# Patient Record
Sex: FEMALE | URBAN - NONMETROPOLITAN AREA
[De-identification: names, ages, dates, MRNs, and addresses within clinical notes are randomized per-mention and may not be internally consistent; named-entity substitution may affect disease eponyms.]

---

## 2020-08-12 ENCOUNTER — APPOINTMENT (RX ONLY)
Dept: URBAN - NONMETROPOLITAN AREA CLINIC 22 | Facility: CLINIC | Age: 75
Setting detail: DERMATOLOGY
End: 2020-08-12

## 2020-08-12 DIAGNOSIS — Z85.828 PERSONAL HISTORY OF OTHER MALIGNANT NEOPLASM OF SKIN: ICD-10-CM

## 2020-08-12 DIAGNOSIS — L57.8 OTHER SKIN CHANGES DUE TO CHRONIC EXPOSURE TO NONIONIZING RADIATION: ICD-10-CM

## 2020-08-12 DIAGNOSIS — L82.1 OTHER SEBORRHEIC KERATOSIS: ICD-10-CM

## 2020-08-12 DIAGNOSIS — D22 MELANOCYTIC NEVI: ICD-10-CM

## 2020-08-12 DIAGNOSIS — D18.0 HEMANGIOMA: ICD-10-CM

## 2020-08-12 DIAGNOSIS — D485 NEOPLASM OF UNCERTAIN BEHAVIOR OF SKIN: ICD-10-CM

## 2020-08-12 PROBLEM — D22.5 MELANOCYTIC NEVI OF TRUNK: Status: ACTIVE | Noted: 2020-08-12

## 2020-08-12 PROBLEM — D18.01 HEMANGIOMA OF SKIN AND SUBCUTANEOUS TISSUE: Status: ACTIVE | Noted: 2020-08-12

## 2020-08-12 PROBLEM — D48.5 NEOPLASM OF UNCERTAIN BEHAVIOR OF SKIN: Status: ACTIVE | Noted: 2020-08-12

## 2020-08-12 PROCEDURE — ? BIOPSY BY SHAVE METHOD

## 2020-08-12 PROCEDURE — 11103 TANGNTL BX SKIN EA SEP/ADDL: CPT

## 2020-08-12 PROCEDURE — 99203 OFFICE O/P NEW LOW 30 MIN: CPT | Mod: 25

## 2020-08-12 PROCEDURE — 11102 TANGNTL BX SKIN SINGLE LES: CPT

## 2020-08-12 PROCEDURE — ? ADDITIONAL NOTES

## 2020-08-12 PROCEDURE — ? COUNSELING

## 2020-08-12 PROCEDURE — ? FULL BODY SKIN EXAM

## 2020-08-12 ASSESSMENT — LOCATION DETAILED DESCRIPTION DERM
LOCATION DETAILED: SUPERIOR THORACIC SPINE
LOCATION DETAILED: LEFT PROXIMAL DORSAL FOREARM
LOCATION DETAILED: UPPER STERNUM
LOCATION DETAILED: RIGHT INFERIOR MEDIAL FOREHEAD
LOCATION DETAILED: UPPER STERNUM
LOCATION DETAILED: NASAL SUPRATIP

## 2020-08-12 ASSESSMENT — LOCATION ZONE DERM
LOCATION ZONE: TRUNK
LOCATION ZONE: ARM
LOCATION ZONE: NOSE
LOCATION ZONE: TRUNK
LOCATION ZONE: FACE

## 2020-08-12 ASSESSMENT — LOCATION SIMPLE DESCRIPTION DERM
LOCATION SIMPLE: CHEST
LOCATION SIMPLE: NOSE
LOCATION SIMPLE: UPPER BACK
LOCATION SIMPLE: RIGHT FOREHEAD
LOCATION SIMPLE: CHEST
LOCATION SIMPLE: LEFT FOREARM

## 2020-08-12 NOTE — PROCEDURE: MIPS QUALITY
Quality 226: Preventive Care And Screening: Tobacco Use: Screening And Cessation Intervention: Patient screened for tobacco use and is an ex/non-smoker
Quality 47: Advance Care Plan: Advance care planning not documented, reason not otherwise specified.
Quality 130: Documentation Of Current Medications In The Medical Record: Current Medications Documented
Quality 431: Preventive Care And Screening: Unhealthy Alcohol Use - Screening: Patient screened for unhealthy alcohol use using a single question and scores less than 2 times per year
Detail Level: Detailed

## 2020-08-12 NOTE — PROCEDURE: BIOPSY BY SHAVE METHOD
Detail Level: Detailed
Depth Of Biopsy: dermis
Was A Bandage Applied: Yes
Size Of Lesion In Cm: 0
Biopsy Type: H and E
Biopsy Method: Dermablade
Anesthesia Type: 1% lidocaine without epinephrine
Anesthesia Volume In Cc (Will Not Render If 0): 0.5
Hemostasis: Cole's
Wound Care: Petrolatum
Dressing: bandage
Destruction After The Procedure: No
Type Of Destruction Used: Curettage
Curettage Text: The wound bed was treated with curettage after the biopsy was performed.
Cryotherapy Text: The wound bed was treated with cryotherapy after the biopsy was performed.
Electrodesiccation Text: The wound bed was treated with electrodesiccation after the biopsy was performed.
Electrodesiccation And Curettage Text: The wound bed was treated with electrodesiccation and curettage after the biopsy was performed.
Silver Nitrate Text: The wound bed was treated with silver nitrate after the biopsy was performed.
Lab: 6
Lab Facility: [id number]
Consent: Written consent was obtained and risks were reviewed including but not limited to scarring, infection, bleeding, scabbing, incomplete removal, nerve damage and allergy to anesthesia.
Post-Care Instructions: I reviewed with the patient in detail post-care instructions. Patient is to keep the biopsy site dry overnight, and then apply bacitracin twice daily until healed. Patient may apply hydrogen peroxide soaks to remove any crusting.
Notification Instructions: Patient will be notified of biopsy results. However, patient instructed to call the office if not contacted within 2 weeks.
Billing Type: Third-Party Bill
Information: Selecting Yes will display possible errors in your note based on the variables you have selected. This validation is only offered as a suggestion for you. PLEASE NOTE THAT THE VALIDATION TEXT WILL BE REMOVED WHEN YOU FINALIZE YOUR NOTE. IF YOU WANT TO FAX A PRELIMINARY NOTE YOU WILL NEED TO TOGGLE THIS TO 'NO' IF YOU DO NOT WANT IT IN YOUR FAXED NOTE.

## 2021-01-20 ENCOUNTER — OFFICE VISIT (OUTPATIENT)
Dept: URBAN - METROPOLITAN AREA CLINIC 63 | Facility: CLINIC | Age: 76
End: 2021-01-20

## 2021-02-19 ENCOUNTER — OFFICE VISIT (OUTPATIENT)
Dept: URBAN - METROPOLITAN AREA CLINIC 63 | Facility: CLINIC | Age: 76
End: 2021-02-19

## 2021-03-03 ENCOUNTER — OFFICE VISIT (OUTPATIENT)
Dept: URBAN - METROPOLITAN AREA CLINIC 63 | Facility: CLINIC | Age: 76
End: 2021-03-03

## 2021-03-09 ENCOUNTER — OFFICE VISIT (OUTPATIENT)
Dept: URBAN - METROPOLITAN AREA TELEHEALTH 2 | Facility: TELEHEALTH | Age: 76
End: 2021-03-09

## 2021-09-20 ENCOUNTER — OFFICE VISIT (OUTPATIENT)
Dept: URBAN - METROPOLITAN AREA CLINIC 121 | Facility: CLINIC | Age: 76
End: 2021-09-20

## 2021-11-05 ENCOUNTER — OFFICE VISIT (OUTPATIENT)
Dept: URBAN - METROPOLITAN AREA CLINIC 63 | Facility: CLINIC | Age: 76
End: 2021-11-05

## 2022-05-26 ENCOUNTER — OFFICE VISIT (OUTPATIENT)
Dept: URBAN - METROPOLITAN AREA CLINIC 60 | Facility: CLINIC | Age: 77
End: 2022-05-26

## 2022-06-01 ENCOUNTER — OFFICE VISIT (OUTPATIENT)
Dept: URBAN - METROPOLITAN AREA SURGERY CENTER 4 | Facility: SURGERY CENTER | Age: 77
End: 2022-06-01

## 2022-06-03 LAB — PATHOLOGY (INDENTED REPORT): (no result)

## 2022-06-09 ENCOUNTER — OFFICE VISIT (OUTPATIENT)
Dept: URBAN - METROPOLITAN AREA TELEHEALTH 2 | Facility: TELEHEALTH | Age: 77
End: 2022-06-09

## 2022-06-09 ENCOUNTER — OFFICE VISIT (OUTPATIENT)
Dept: URBAN - METROPOLITAN AREA CLINIC 60 | Facility: CLINIC | Age: 77
End: 2022-06-09

## 2022-06-30 ENCOUNTER — WEB ENCOUNTER (OUTPATIENT)
Dept: URBAN - METROPOLITAN AREA CLINIC 63 | Facility: CLINIC | Age: 77
End: 2022-06-30

## 2022-07-06 ENCOUNTER — OFFICE VISIT (OUTPATIENT)
Dept: URBAN - METROPOLITAN AREA CLINIC 63 | Facility: CLINIC | Age: 77
End: 2022-07-06

## 2022-07-06 ENCOUNTER — OFFICE VISIT (OUTPATIENT)
Dept: URBAN - METROPOLITAN AREA CLINIC 63 | Facility: CLINIC | Age: 77
End: 2022-07-06
Payer: MEDICARE

## 2022-07-06 DIAGNOSIS — K92.2 ACUTE GASTROINTESTINAL BLEEDING: ICD-10-CM

## 2022-07-06 PROCEDURE — 91110 GI TRC IMG INTRAL ESOPH-ILE: CPT | Performed by: INTERNAL MEDICINE

## 2022-07-09 ENCOUNTER — TELEPHONE ENCOUNTER (OUTPATIENT)
Dept: URBAN - METROPOLITAN AREA CLINIC 121 | Facility: CLINIC | Age: 77
End: 2022-07-09

## 2022-07-09 RX ORDER — LOSARTAN POTASSIUM AND HYDROCHLOROTHIAZIDE 25; 100 MG/1; MG/1
TABLET, FILM COATED ORAL
Refills: 0 | OUTPATIENT
Start: 2021-02-13 | End: 2021-11-05

## 2022-07-09 RX ORDER — CLOPIDOGREL 75 MG/1
TABLET ORAL ONCE A DAY
Refills: 0 | OUTPATIENT
Start: 2021-11-05 | End: 2022-05-26

## 2022-07-09 RX ORDER — ASPIRIN 81 MG/1
TABLET, CHEWABLE ORAL
Refills: 0 | OUTPATIENT
Start: 2019-01-28 | End: 2021-11-05

## 2022-07-09 RX ORDER — SUCRALFATE 1 G/1
TABLET ORAL
Refills: 0 | OUTPATIENT
Start: 2021-11-05 | End: 2022-05-26

## 2022-07-09 RX ORDER — FLUTICASONE PROPIONATE 50 UG/1
SPRAY, METERED NASAL
Refills: 0 | OUTPATIENT
Start: 2019-01-28 | End: 2021-11-05

## 2022-07-09 RX ORDER — AMLODIPINE BESYLATE 2.5 MG/1
TABLET ORAL
Refills: 0 | OUTPATIENT
Start: 2021-02-13 | End: 2021-11-05

## 2022-07-09 RX ORDER — CLOPIDOGREL 75 MG/1
TABLET ORAL
Refills: 0 | OUTPATIENT
Start: 2021-02-13 | End: 2021-11-05

## 2022-07-09 RX ORDER — MEXILETINE HYDROCHLORIDE 150 MG/1
2-3 X A DAY CAPSULE ORAL
Refills: 0 | OUTPATIENT
Start: 2021-11-05 | End: 2022-05-26

## 2022-07-09 RX ORDER — TORSEMIDE 20 MG/1
TABLET ORAL
Refills: 0 | OUTPATIENT
Start: 2019-01-28 | End: 2021-03-02

## 2022-07-09 RX ORDER — DICLOFENAC SODIUM 1 %
GEL (GRAM) TOPICAL AS NEEDED
Refills: 0 | OUTPATIENT
Start: 2021-11-05 | End: 2022-05-26

## 2022-07-09 RX ORDER — LOSARTAN POTASSIUM AND HYDROCHLOROTHIAZIDE 50; 12.5 MG/1; MG/1
TABLET, FILM COATED ORAL ONCE A DAY
Refills: 0 | OUTPATIENT
Start: 2021-11-05 | End: 2022-05-26

## 2022-07-09 RX ORDER — MELOXICAM 15 MG/1
TABLET ORAL ONCE A DAY
Refills: 0 | OUTPATIENT
Start: 2021-11-05 | End: 2022-05-26

## 2022-07-09 RX ORDER — TORSEMIDE 10 MG/1
TABLET ORAL
Refills: 0 | OUTPATIENT
Start: 2021-02-13 | End: 2021-11-05

## 2022-07-09 RX ORDER — UBIDECARENONE/VIT E ACET 100MG-5
CAPSULE ORAL ONCE A DAY
Refills: 0 | OUTPATIENT
Start: 2021-11-05 | End: 2022-05-26

## 2022-07-09 RX ORDER — METRONIDAZOLE 7.5 MG/G
GEL VAGINAL TWICE A DAY
Refills: 0 | OUTPATIENT
Start: 2021-11-05 | End: 2022-05-26

## 2022-07-09 RX ORDER — DICLOFENAC SODIUM 1 %
GEL (GRAM) TOPICAL
Refills: 0 | OUTPATIENT
Start: 2019-01-28 | End: 2021-11-05

## 2022-07-09 RX ORDER — TORSEMIDE 10 MG/1
TABLET ORAL ONCE A DAY
Refills: 0 | OUTPATIENT
Start: 2021-11-05 | End: 2022-05-26

## 2022-07-09 RX ORDER — ASPIRIN 81 MG/1
TABLET, COATED ORAL
Refills: 0 | OUTPATIENT
Start: 2022-05-26 | End: 2022-06-09

## 2022-07-09 RX ORDER — OMEPRAZOLE 40 MG/1
ONCE A DAY, PO, QAM CAPSULE, DELAYED RELEASE ORAL ONCE A DAY
Refills: 1 | OUTPATIENT
Start: 2021-01-20 | End: 2021-01-20

## 2022-07-09 RX ORDER — ASPIRIN 81 MG/1
TABLET, CHEWABLE ORAL ONCE A DAY
Refills: 0 | OUTPATIENT
Start: 2021-11-05 | End: 2022-05-26

## 2022-07-09 RX ORDER — MEXILETINE HYDROCHLORIDE 150 MG/1
CAPSULE ORAL
Refills: 0 | OUTPATIENT
Start: 2019-01-28 | End: 2021-11-05

## 2022-07-09 RX ORDER — MELOXICAM 15 MG/1
TABLET ORAL
Refills: 0 | OUTPATIENT
Start: 2021-02-13 | End: 2021-11-05

## 2022-07-09 RX ORDER — OMEPRAZOLE 40 MG/1
CAPSULE, DELAYED RELEASE ORAL
Refills: 0 | OUTPATIENT
Start: 2021-12-20 | End: 2022-05-26

## 2022-07-09 RX ORDER — POTASSIUM CHLORIDE 750 MG/1
TABLET, EXTENDED RELEASE ORAL
Refills: 0 | OUTPATIENT
Start: 2021-02-13 | End: 2021-11-05

## 2022-07-09 RX ORDER — AMLODIPINE BESYLATE 2.5 MG/1
TABLET ORAL
Refills: 0 | OUTPATIENT
Start: 2021-01-20 | End: 2021-03-02

## 2022-07-09 RX ORDER — LOSARTAN POTASSIUM AND HYDROCHLOROTHIAZIDE 50; 12.5 MG/1; MG/1
TABLET, FILM COATED ORAL
Refills: 0 | OUTPATIENT
Start: 2019-01-28 | End: 2021-03-02

## 2022-07-09 RX ORDER — POTASSIUM CHLORIDE 1.5 G/1.58G
POWDER, FOR SOLUTION ORAL
Refills: 0 | OUTPATIENT
Start: 2019-01-28 | End: 2021-03-02

## 2022-07-09 RX ORDER — OMEPRAZOLE 40 MG/1
TAKE 1 CAPSULE BY MOUTH EVERY MORNING CAPSULE, DELAYED RELEASE ORAL
Refills: 1 | OUTPATIENT
Start: 2021-01-20 | End: 2021-07-06

## 2022-07-09 RX ORDER — OMEPRAZOLE 40 MG/1
ONCE A DAY CAPSULE, DELAYED RELEASE ORAL ONCE A DAY
Refills: 0 | OUTPATIENT
Start: 2021-07-06 | End: 2021-12-20

## 2022-07-09 RX ORDER — CLOPIDOGREL BISULFATE 75 MG
TABLET ORAL
Refills: 0 | OUTPATIENT
Start: 2019-01-28 | End: 2021-03-02

## 2022-07-09 RX ORDER — HYDROCODONE BITARTRATE AND ACETAMINOPHEN 5; 325 MG/1; MG/1
TABLET ORAL
Refills: 0 | OUTPATIENT
Start: 2021-01-14 | End: 2021-03-02

## 2022-07-09 RX ORDER — OMEPRAZOLE 40 MG/1
CAPSULE, DELAYED RELEASE ORAL
Refills: 0 | OUTPATIENT
Start: 2022-05-26 | End: 2022-05-27

## 2022-07-09 RX ORDER — POTASSIUM CHLORIDE 750 MG/1
TABLET, EXTENDED RELEASE ORAL ONCE A DAY
Refills: 0 | OUTPATIENT
Start: 2021-11-05 | End: 2022-05-26

## 2022-07-09 RX ORDER — TRIAMCINOLONE ACETONIDE 1 MG/G
OINTMENT TOPICAL AS NEEDED
Refills: 0 | OUTPATIENT
Start: 2021-11-05 | End: 2022-05-26

## 2022-07-09 RX ORDER — SUCRALFATE 1 G/1
TABLET ORAL
Refills: 0 | OUTPATIENT
Start: 2019-01-28 | End: 2021-11-05

## 2022-07-09 RX ORDER — MELOXICAM 7.5 MG/1
TABLET ORAL
Refills: 0 | OUTPATIENT
Start: 2019-01-28 | End: 2021-03-02

## 2022-07-09 RX ORDER — UBIDECARENONE/VIT E ACET 100MG-5
CAPSULE ORAL
Refills: 0 | OUTPATIENT
Start: 2019-01-28 | End: 2021-11-05

## 2022-07-09 RX ORDER — AMLODIPINE BESYLATE 2.5 MG/1
TABLET ORAL ONCE A DAY
Refills: 0 | OUTPATIENT
Start: 2021-11-05 | End: 2022-05-26

## 2022-07-09 RX ORDER — CARVEDILOL 3.12 MG/1
TABLET, FILM COATED ORAL TWICE A DAY
Refills: 0 | OUTPATIENT
Start: 2021-11-05 | End: 2022-05-26

## 2022-07-09 RX ORDER — FLUTICASONE PROPIONATE 50 UG/1
SPRAY, METERED NASAL
Refills: 0 | OUTPATIENT
Start: 2021-11-05 | End: 2022-05-26

## 2022-07-10 ENCOUNTER — TELEPHONE ENCOUNTER (OUTPATIENT)
Dept: URBAN - METROPOLITAN AREA CLINIC 121 | Facility: CLINIC | Age: 77
End: 2022-07-10

## 2022-07-10 RX ORDER — POTASSIUM CHLORIDE 750 MG/1
TABLET, EXTENDED RELEASE ORAL ONCE A DAY
Refills: 0 | Status: ACTIVE | COMMUNITY
Start: 2022-05-26

## 2022-07-10 RX ORDER — METRONIDAZOLE 7.5 MG/G
GEL VAGINAL TWICE A DAY
Refills: 0 | Status: ACTIVE | COMMUNITY
Start: 2022-05-26

## 2022-07-10 RX ORDER — ASPIRIN 81 MG/1
TABLET, COATED ORAL
Refills: 0 | Status: ACTIVE | COMMUNITY
Start: 2022-06-09

## 2022-07-10 RX ORDER — MELOXICAM 15 MG/1
TABLET ORAL ONCE A DAY
Refills: 0 | Status: ACTIVE | COMMUNITY
Start: 2022-05-26

## 2022-07-10 RX ORDER — TRIAMCINOLONE ACETONIDE 1 MG/G
OINTMENT TOPICAL AS NEEDED
Refills: 0 | Status: ACTIVE | COMMUNITY
Start: 2022-05-26

## 2022-07-10 RX ORDER — ASPIRIN 81 MG/1
TABLET, CHEWABLE ORAL ONCE A DAY
Refills: 0 | Status: ACTIVE | COMMUNITY
Start: 2022-05-26

## 2022-07-10 RX ORDER — FLUTICASONE PROPIONATE 50 UG/1
SPRAY, METERED NASAL
Refills: 0 | Status: ACTIVE | COMMUNITY
Start: 2022-05-26

## 2022-07-10 RX ORDER — OMEPRAZOLE 40 MG/1
TAKE ONE CAPSULE BY MOUTH ONCE A DAY CAPSULE, DELAYED RELEASE ORAL ONCE A DAY
Refills: 0 | Status: ACTIVE | COMMUNITY
Start: 2021-12-20

## 2022-07-10 RX ORDER — MEXILETINE HYDROCHLORIDE 150 MG/1
2-3 X A DAY CAPSULE ORAL
Refills: 0 | Status: ACTIVE | COMMUNITY
Start: 2022-05-26

## 2022-07-10 RX ORDER — TORSEMIDE 10 MG/1
TABLET ORAL ONCE A DAY
Refills: 0 | Status: ACTIVE | COMMUNITY
Start: 2022-05-26

## 2022-07-10 RX ORDER — PANTOPRAZOLE SODIUM 40 MG/1
TWICE A DAY, PO, QAM AND QHS TABLET, DELAYED RELEASE ORAL TWICE A DAY
Refills: 3 | Status: ACTIVE | COMMUNITY
Start: 2022-05-27

## 2022-07-10 RX ORDER — DICLOFENAC SODIUM 1 %
GEL (GRAM) TOPICAL AS NEEDED
Refills: 0 | Status: ACTIVE | COMMUNITY
Start: 2022-05-26

## 2022-07-10 RX ORDER — LOSARTAN POTASSIUM AND HYDROCHLOROTHIAZIDE 50; 12.5 MG/1; MG/1
TABLET, FILM COATED ORAL ONCE A DAY
Refills: 0 | Status: ACTIVE | COMMUNITY
Start: 2022-05-26

## 2022-07-10 RX ORDER — UBIDECARENONE/VIT E ACET 100MG-5
CAPSULE ORAL ONCE A DAY
Refills: 0 | Status: ACTIVE | COMMUNITY
Start: 2022-05-26

## 2022-07-10 RX ORDER — SUCRALFATE 1 G/1
TABLET ORAL
Refills: 0 | Status: ACTIVE | COMMUNITY
Start: 2022-05-26

## 2022-07-11 ENCOUNTER — TELEPHONE ENCOUNTER (OUTPATIENT)
Dept: URBAN - METROPOLITAN AREA CLINIC 63 | Facility: CLINIC | Age: 77
End: 2022-07-11

## 2022-07-12 ENCOUNTER — TELEPHONE ENCOUNTER (OUTPATIENT)
Dept: URBAN - METROPOLITAN AREA CLINIC 63 | Facility: CLINIC | Age: 77
End: 2022-07-12

## 2022-07-13 ENCOUNTER — OFFICE VISIT (OUTPATIENT)
Dept: URBAN - METROPOLITAN AREA CLINIC 63 | Facility: CLINIC | Age: 77
End: 2022-07-13

## 2022-07-18 ENCOUNTER — TELEPHONE ENCOUNTER (OUTPATIENT)
Dept: URBAN - METROPOLITAN AREA CLINIC 63 | Facility: CLINIC | Age: 77
End: 2022-07-18

## 2022-07-26 ENCOUNTER — DASHBOARD ENCOUNTERS (OUTPATIENT)
Age: 77
End: 2022-07-26

## 2022-07-26 ENCOUNTER — OFFICE VISIT (OUTPATIENT)
Dept: URBAN - METROPOLITAN AREA TELEHEALTH 1 | Facility: TELEHEALTH | Age: 77
End: 2022-07-26
Payer: MEDICARE

## 2022-07-26 DIAGNOSIS — K92.2 GASTROINTESTINAL HEMORRHAGE, UNSPECIFIED GASTROINTESTINAL HEMORRHAGE TYPE: ICD-10-CM

## 2022-07-26 PROCEDURE — 99213 OFFICE O/P EST LOW 20 MIN: CPT | Performed by: NURSE PRACTITIONER

## 2022-07-26 RX ORDER — CARVEDILOL 12.5 MG/1
1 TABLET WITH FOOD TABLET, FILM COATED ORAL TWICE A DAY
Status: ACTIVE | COMMUNITY

## 2022-07-26 RX ORDER — APIXABAN 5 MG/1
AS DIRECTED TABLET, FILM COATED ORAL TWICE A DAY
Status: ACTIVE | COMMUNITY

## 2022-07-26 RX ORDER — FLUTICASONE PROPIONATE 50 UG/1
SPRAY, METERED NASAL
Refills: 0 | COMMUNITY
Start: 2022-05-26

## 2022-07-26 RX ORDER — PANTOPRAZOLE SODIUM 40 MG/1
TWICE A DAY, PO, QAM AND QHS TABLET, DELAYED RELEASE ORAL TWICE A DAY
Refills: 3 | COMMUNITY
Start: 2022-05-27

## 2022-07-26 RX ORDER — MELOXICAM 15 MG/1
TABLET ORAL ONCE A DAY
Refills: 0 | COMMUNITY
Start: 2022-05-26

## 2022-07-26 RX ORDER — ASPIRIN 81 MG/1
TABLET, CHEWABLE ORAL ONCE A DAY
Refills: 0 | COMMUNITY
Start: 2022-05-26

## 2022-07-26 RX ORDER — ASPIRIN 81 MG/1
TABLET, COATED ORAL
Refills: 0 | COMMUNITY
Start: 2022-06-09

## 2022-07-26 RX ORDER — LOSARTAN POTASSIUM AND HYDROCHLOROTHIAZIDE 50; 12.5 MG/1; MG/1
TABLET, FILM COATED ORAL ONCE A DAY
Refills: 0 | COMMUNITY
Start: 2022-05-26

## 2022-07-26 RX ORDER — MEXILETINE HYDROCHLORIDE 150 MG/1
2-3 X A DAY CAPSULE ORAL
Refills: 0 | COMMUNITY
Start: 2022-05-26

## 2022-07-26 RX ORDER — SUCRALFATE 1 G/1
TABLET ORAL
Refills: 0 | COMMUNITY
Start: 2022-05-26

## 2022-07-26 RX ORDER — METRONIDAZOLE 7.5 MG/G
GEL VAGINAL TWICE A DAY
Refills: 0 | COMMUNITY
Start: 2022-05-26

## 2022-07-26 RX ORDER — DICLOFENAC SODIUM 1 %
GEL (GRAM) TOPICAL AS NEEDED
Refills: 0 | COMMUNITY
Start: 2022-05-26

## 2022-07-26 RX ORDER — POTASSIUM CHLORIDE 750 MG/1
TABLET, EXTENDED RELEASE ORAL ONCE A DAY
Refills: 0 | COMMUNITY
Start: 2022-05-26

## 2022-07-26 RX ORDER — OMEPRAZOLE 40 MG/1
TAKE ONE CAPSULE BY MOUTH ONCE A DAY CAPSULE, DELAYED RELEASE ORAL ONCE A DAY
Refills: 0 | COMMUNITY
Start: 2021-12-20

## 2022-07-26 RX ORDER — UBIDECARENONE/VIT E ACET 100MG-5
CAPSULE ORAL ONCE A DAY
Refills: 0 | COMMUNITY
Start: 2022-05-26

## 2022-07-26 RX ORDER — TORSEMIDE 10 MG/1
TABLET ORAL ONCE A DAY
Refills: 0 | COMMUNITY
Start: 2022-05-26

## 2022-07-26 RX ORDER — TRIAMCINOLONE ACETONIDE 1 MG/G
OINTMENT TOPICAL AS NEEDED
Refills: 0 | COMMUNITY
Start: 2022-05-26

## 2022-07-26 NOTE — HPI-TODAY'S VISIT:
Emily Brannon is a very pleasant 77-year-old female seen in follow-up of capsule endoscopy secondary to GI bleed (see results below).  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  Since last office visit, Emily was admitted to [facility] due to acute saddle pulmonary embolism without acute cor pulmonale.  She was discharged on 13 July 2022 and was started on Eliquis (see discharge summary below).  Emily is seen today via telehealth and complains of "darkening stools" since she started Eliquis and increasing fatigue.  She is concerned about her GI bleed appearing again with the use of anticoagulation.  She also complains of back pain and belching but is otherwise asymptomatic from a GI perspective.  She has left a message with her hematologist in regards to her back pain but has not spoken with her pulmonologist.  ******************* As per [facility] discharge summary dated 13 July 2022 (Liz Corona MD): "Acute saddle pulmonary embolism without acute cor pulmonale (HCC). Presentation: 77 y.o.  female with right breast cancer, elevated factor VIII antibody, hx of A fib, one time episode of GIB who presented to the emergency department c/o SOB and chest pain. In the ED troponin was undetectable, d-dimer elevated. CTA chest positive for small saddle embolus and right lower lung subsegmental pulmonary embolism. Placed on a heparin drip.  Hospital Course: Patient admitted for acute saddle pulmonary embolism. Venous duplex of lower extremities positive for distal DVT bilateral lower extremity.  Work of breathing significant improved after initiating heparin drip.  tPA did not need. Due to underline hx elevated factor VIII antibody, hx of A fib, one time episode of GIB hematology and cardio was consulted to advice long term treatment.  Consultants agreed to discontinue heparin drip and initiate therapeutic Lovenox twice daily.  Patient will remain on Lovenox until her appointment with hematology on 7/19, where she will undergo follow-up labs and if stable will be initiated on Eliquis.  If patient bleeds, she will need to undergo endoscopy and IVC filter placement.  Patient was given strict return precautions and was counseled extensively by myself and pulmonologist.  Exam on day of discharge: No acute distress, AOx4, unlabored breathing, RRR, no edema."

## 2022-07-26 NOTE — HPI-PREVIOUS PROCEDURES
Capsule endoscopy/06 July 2022.  Gastric passage time: 0h 9m. Small bowel passage time: 4h 30m. Findings: Solitary AVM in proximal small bowel, likely distal duodenum or proximal jejunum. Recommendations: Consider enteroscopy from above if active bleeding occurs or if blood loss exceeds production. **************** EGD/01 June 2022.  An incidental Schatzki's ring was found at the gastroesophageal junction.  The examined esophagus was otherwise unremarkable.  A 2 cm hiatal hernia was present.  Minimal gastritis found in the gastric antrum.  A small diverticulum was found in the second portion of the duodenum.  The duodenal bulb and second portion of the duodenum was otherwise unremarkable.  Pathology demonstrates reactive/reflux gastropathy in the antral biopsy and features of reflux in the lower third esophageal biopsy.  All remaining findings are negative or benign. **************** Colonoscopy/01 June 2022.  8 mm hyperplastic polyp removed from the mid sigmoid colon.  Diverticulosis in the descending and sigmoid colon with internal hemorrhoids present.  All remaining findings are negative or benign.  Repeat screening colonoscopy is not recommended due to age. **************** EGD-colonoscopy/27 November 2012 (Will Faria MD).  1.  Esophagitis with a slightly irregular squamocolumnar junction.  Distal esophageal biopsies obtained.  2.  Sliding hiatal hernia, up to 2 cm in length, but often absent.  3.  Proximal gastric polyp, biopsies obtained, suspect nonneoplastic.  4.  Antral gastropathy, biopsies obtained.  5.  3 mm ascending colon 3 mm rectal polyps, completely removed with cold biopsies.  6.  Transverse colonic and left-sided diverticulosis coli.  7.  Grade 1 internal hemorrhoids.  Pathology demonstrates mild chronic inflammation in the antral biopsy, marked chronic inflammation and reactive changes in the distal esophageal biopsy.  Hyperplastic gastric fundal polyp biopsy.  Both colonic polyps are hyperplastic in nature.  All remaining findings are negative or benign.

## 2022-07-26 NOTE — HPI-PREVIOUS IMAGING
CTA chest-PE eval/08 July 2022.  Filling defects within the right segmental pulmonary arterial branches extending distally and small saddle embolus compatible with acute pulmonary emboli.  Elevated RV/LV ratio suggestive of right heart strain.  Right lower lobe consolidation concerning for pulmonary infarct. *************** Small bowel follow-through/16 June 2022.  1.  Normal small bowel series.  2.  A moderate volume of colonic retained stool.